# Patient Record
Sex: FEMALE | Race: WHITE | NOT HISPANIC OR LATINO | URBAN - METROPOLITAN AREA
[De-identification: names, ages, dates, MRNs, and addresses within clinical notes are randomized per-mention and may not be internally consistent; named-entity substitution may affect disease eponyms.]

---

## 2017-01-17 ENCOUNTER — OFFICE (AMBULATORY)
Dept: URBAN - METROPOLITAN AREA CLINIC 64 | Facility: CLINIC | Age: 72
End: 2017-01-17

## 2017-01-17 VITALS — HEIGHT: 67 IN

## 2017-01-17 DIAGNOSIS — K64.1 SECOND DEGREE HEMORRHOIDS: ICD-10-CM

## 2017-01-17 PROCEDURE — 46221 LIGATION OF HEMORRHOID(S): CPT | Performed by: INTERNAL MEDICINE

## 2017-02-03 ENCOUNTER — OFFICE (AMBULATORY)
Dept: URBAN - METROPOLITAN AREA CLINIC 64 | Facility: CLINIC | Age: 72
End: 2017-02-03

## 2017-02-03 VITALS — HEIGHT: 67 IN

## 2017-02-03 DIAGNOSIS — K64.1 SECOND DEGREE HEMORRHOIDS: ICD-10-CM

## 2017-02-03 PROCEDURE — 46221 LIGATION OF HEMORRHOID(S): CPT | Performed by: INTERNAL MEDICINE

## 2017-02-27 ENCOUNTER — OFFICE (AMBULATORY)
Dept: URBAN - METROPOLITAN AREA CLINIC 64 | Facility: CLINIC | Age: 72
End: 2017-02-27

## 2017-02-27 VITALS — HEIGHT: 67 IN

## 2017-02-27 DIAGNOSIS — K64.1 SECOND DEGREE HEMORRHOIDS: ICD-10-CM

## 2017-02-27 DIAGNOSIS — K62.5 HEMORRHAGE OF ANUS AND RECTUM: ICD-10-CM

## 2017-02-27 PROCEDURE — 46221 LIGATION OF HEMORRHOID(S): CPT | Performed by: INTERNAL MEDICINE

## 2020-04-13 ENCOUNTER — TRANSCRIBE ORDERS (OUTPATIENT)
Dept: LAB | Facility: HOSPITAL | Age: 75
End: 2020-04-13

## 2020-04-13 ENCOUNTER — LAB (OUTPATIENT)
Dept: LAB | Facility: HOSPITAL | Age: 75
End: 2020-04-13

## 2020-04-13 DIAGNOSIS — I10 HYPERTENSION, UNSPECIFIED TYPE: Primary | ICD-10-CM

## 2020-04-13 DIAGNOSIS — I10 HYPERTENSION, UNSPECIFIED TYPE: ICD-10-CM

## 2020-04-13 LAB
ANION GAP SERPL CALCULATED.3IONS-SCNC: 11.9 MMOL/L (ref 5–15)
BASOPHILS # BLD AUTO: 0.04 10*3/MM3 (ref 0–0.2)
BASOPHILS NFR BLD AUTO: 0.7 % (ref 0–1.5)
BUN BLD-MCNC: 15 MG/DL (ref 8–23)
BUN/CREAT SERPL: 19 (ref 7–25)
CALCIUM SPEC-SCNC: 9.3 MG/DL (ref 8.6–10.5)
CHLORIDE SERPL-SCNC: 100 MMOL/L (ref 98–107)
CO2 SERPL-SCNC: 26.1 MMOL/L (ref 22–29)
CREAT BLD-MCNC: 0.79 MG/DL (ref 0.57–1)
DEPRECATED RDW RBC AUTO: 40.2 FL (ref 37–54)
EOSINOPHIL # BLD AUTO: 0.21 10*3/MM3 (ref 0–0.4)
EOSINOPHIL NFR BLD AUTO: 3.5 % (ref 0.3–6.2)
ERYTHROCYTE [DISTWIDTH] IN BLOOD BY AUTOMATED COUNT: 12.6 % (ref 12.3–15.4)
GFR SERPL CREATININE-BSD FRML MDRD: 71 ML/MIN/1.73
GLUCOSE BLD-MCNC: 117 MG/DL (ref 65–99)
HCT VFR BLD AUTO: 36.4 % (ref 34–46.6)
HGB BLD-MCNC: 12.5 G/DL (ref 12–15.9)
IMM GRANULOCYTES # BLD AUTO: 0.01 10*3/MM3 (ref 0–0.05)
IMM GRANULOCYTES NFR BLD AUTO: 0.2 % (ref 0–0.5)
LYMPHOCYTES # BLD AUTO: 1.92 10*3/MM3 (ref 0.7–3.1)
LYMPHOCYTES NFR BLD AUTO: 32.3 % (ref 19.6–45.3)
MCH RBC QN AUTO: 30.3 PG (ref 26.6–33)
MCHC RBC AUTO-ENTMCNC: 34.3 G/DL (ref 31.5–35.7)
MCV RBC AUTO: 88.1 FL (ref 79–97)
MONOCYTES # BLD AUTO: 0.59 10*3/MM3 (ref 0.1–0.9)
MONOCYTES NFR BLD AUTO: 9.9 % (ref 5–12)
NEUTROPHILS # BLD AUTO: 3.18 10*3/MM3 (ref 1.7–7)
NEUTROPHILS NFR BLD AUTO: 53.4 % (ref 42.7–76)
NRBC BLD AUTO-RTO: 0 /100 WBC (ref 0–0.2)
PLATELET # BLD AUTO: 186 10*3/MM3 (ref 140–450)
PMV BLD AUTO: 10.4 FL (ref 6–12)
POTASSIUM BLD-SCNC: 4.2 MMOL/L (ref 3.5–5.2)
RBC # BLD AUTO: 4.13 10*6/MM3 (ref 3.77–5.28)
SODIUM BLD-SCNC: 138 MMOL/L (ref 136–145)
WBC NRBC COR # BLD: 5.95 10*3/MM3 (ref 3.4–10.8)

## 2020-04-13 PROCEDURE — 85025 COMPLETE CBC W/AUTO DIFF WBC: CPT

## 2020-04-13 PROCEDURE — 80048 BASIC METABOLIC PNL TOTAL CA: CPT

## 2020-04-13 PROCEDURE — 36415 COLL VENOUS BLD VENIPUNCTURE: CPT

## 2023-03-01 ENCOUNTER — APPOINTMENT (OUTPATIENT)
Dept: GENERAL RADIOLOGY | Facility: HOSPITAL | Age: 78
End: 2023-03-01
Payer: MEDICARE

## 2023-03-01 ENCOUNTER — HOSPITAL ENCOUNTER (EMERGENCY)
Facility: HOSPITAL | Age: 78
Discharge: HOME | End: 2023-03-02
Attending: EMERGENCY MEDICINE | Admitting: EMERGENCY MEDICINE
Payer: MEDICARE

## 2023-03-01 DIAGNOSIS — R55 NEAR SYNCOPE: Primary | ICD-10-CM

## 2023-03-01 PROCEDURE — 71045 X-RAY EXAM CHEST 1 VIEW: CPT

## 2023-03-01 PROCEDURE — 99283 EMERGENCY DEPT VISIT LOW MDM: CPT

## 2023-03-01 PROCEDURE — 83880 ASSAY OF NATRIURETIC PEPTIDE: CPT | Performed by: EMERGENCY MEDICINE

## 2023-03-01 PROCEDURE — 83735 ASSAY OF MAGNESIUM: CPT | Performed by: EMERGENCY MEDICINE

## 2023-03-01 PROCEDURE — 93005 ELECTROCARDIOGRAM TRACING: CPT

## 2023-03-01 PROCEDURE — 36415 COLL VENOUS BLD VENIPUNCTURE: CPT

## 2023-03-01 PROCEDURE — 80053 COMPREHEN METABOLIC PANEL: CPT | Performed by: EMERGENCY MEDICINE

## 2023-03-01 PROCEDURE — 84484 ASSAY OF TROPONIN QUANT: CPT | Performed by: EMERGENCY MEDICINE

## 2023-03-01 PROCEDURE — 85025 COMPLETE CBC W/AUTO DIFF WBC: CPT | Performed by: EMERGENCY MEDICINE

## 2023-03-02 VITALS
HEIGHT: 66 IN | RESPIRATION RATE: 16 BRPM | HEART RATE: 60 BPM | OXYGEN SATURATION: 96 % | BODY MASS INDEX: 38.57 KG/M2 | DIASTOLIC BLOOD PRESSURE: 69 MMHG | SYSTOLIC BLOOD PRESSURE: 162 MMHG | TEMPERATURE: 98.4 F | WEIGHT: 240 LBS

## 2023-03-02 LAB
ALBUMIN SERPL-MCNC: 3.5 G/DL (ref 3.5–5.2)
ALBUMIN/GLOB SERPL: 1.1 G/DL
ALP SERPL-CCNC: 90 U/L (ref 39–117)
ALT SERPL W P-5'-P-CCNC: 22 U/L (ref 1–33)
ANION GAP SERPL CALCULATED.3IONS-SCNC: 11 MMOL/L (ref 5–15)
AST SERPL-CCNC: 24 U/L (ref 1–32)
BASOPHILS # BLD AUTO: 0 10*3/MM3 (ref 0–0.2)
BASOPHILS NFR BLD AUTO: 0.6 % (ref 0–1.5)
BILIRUB SERPL-MCNC: 0.4 MG/DL (ref 0–1.2)
BUN SERPL-MCNC: 15 MG/DL (ref 8–23)
BUN/CREAT SERPL: 17.2 (ref 7–25)
CALCIUM SPEC-SCNC: 8.9 MG/DL (ref 8.6–10.5)
CHLORIDE SERPL-SCNC: 101 MMOL/L (ref 98–107)
CO2 SERPL-SCNC: 24 MMOL/L (ref 22–29)
CREAT SERPL-MCNC: 0.87 MG/DL (ref 0.57–1)
DEPRECATED RDW RBC AUTO: 44.6 FL (ref 37–54)
EGFRCR SERPLBLD CKD-EPI 2021: 68.7 ML/MIN/1.73
EOSINOPHIL # BLD AUTO: 0.3 10*3/MM3 (ref 0–0.4)
EOSINOPHIL NFR BLD AUTO: 4.1 % (ref 0.3–6.2)
ERYTHROCYTE [DISTWIDTH] IN BLOOD BY AUTOMATED COUNT: 13.7 % (ref 12.3–15.4)
GLOBULIN UR ELPH-MCNC: 3.2 GM/DL
GLUCOSE SERPL-MCNC: 160 MG/DL (ref 65–99)
HCT VFR BLD AUTO: 34.8 % (ref 34–46.6)
HGB BLD-MCNC: 11.7 G/DL (ref 12–15.9)
HOLD SPECIMEN: NORMAL
HOLD SPECIMEN: NORMAL
LYMPHOCYTES # BLD AUTO: 1.6 10*3/MM3 (ref 0.7–3.1)
LYMPHOCYTES NFR BLD AUTO: 21.6 % (ref 19.6–45.3)
MAGNESIUM SERPL-MCNC: 1.8 MG/DL (ref 1.6–2.4)
MCH RBC QN AUTO: 29.5 PG (ref 26.6–33)
MCHC RBC AUTO-ENTMCNC: 33.6 G/DL (ref 31.5–35.7)
MCV RBC AUTO: 87.8 FL (ref 79–97)
MONOCYTES # BLD AUTO: 0.7 10*3/MM3 (ref 0.1–0.9)
MONOCYTES NFR BLD AUTO: 8.9 % (ref 5–12)
NEUTROPHILS NFR BLD AUTO: 4.9 10*3/MM3 (ref 1.7–7)
NEUTROPHILS NFR BLD AUTO: 64.8 % (ref 42.7–76)
NRBC BLD AUTO-RTO: 0.2 /100 WBC (ref 0–0.2)
NT-PROBNP SERPL-MCNC: 145.1 PG/ML (ref 0–1800)
PLATELET # BLD AUTO: 161 10*3/MM3 (ref 140–450)
PMV BLD AUTO: 8.4 FL (ref 6–12)
POTASSIUM SERPL-SCNC: 3.8 MMOL/L (ref 3.5–5.2)
PROT SERPL-MCNC: 6.7 G/DL (ref 6–8.5)
RBC # BLD AUTO: 3.96 10*6/MM3 (ref 3.77–5.28)
SODIUM SERPL-SCNC: 136 MMOL/L (ref 136–145)
TROPONIN T SERPL HS-MCNC: 21 NG/L
WBC NRBC COR # BLD: 7.5 10*3/MM3 (ref 3.4–10.8)
WHOLE BLOOD HOLD COAG: NORMAL
WHOLE BLOOD HOLD SPECIMEN: NORMAL

## 2023-03-02 NOTE — ED PROVIDER NOTES
"Subjective   History of Present Illness  77-year-old female prior medical history of hypertension, hyperlipidemia, presents status post syncopal episode.  They were standing in line for elevator after event started to feel hot walked to a place where she felt a cool breeze and sat down because she felt like she was going to pass out.  Never lost consciousness.  Denies any chest pain or shortness of breath.  No vomiting.  Has had a cough for the last several days and felt somewhat weak.  No fevers.      Review of Systems   Neurological:        Positive for near syncope.       No past medical history on file.    No Known Allergies    No past surgical history on file.    No family history on file.    Social History     Socioeconomic History   • Marital status:            Objective   Physical Exam  Constitutional:  No acute distress.  Head:  Atraumatic.  Normocephalic.   Eyes:  No scleral icterus. Normal conjunctivae  ENT:  Moist mucosa.  No nasal discharge present.  Cardiovascular:  Well perfused.  Equal pulses.  Regular rhythm and normal rate.  Normal capillary refill.    Pulmonary/Chest:  No respiratory distress.  Airway patent.  No tachypnea.  No accessory muscle usage.  Clear to auscultation bilaterally.  Abdominal:  Nondistended. Nontender.   Extremities: Trace bilateral lower extremity peripheral edema.  No Deformity  Skin:  Warm, dry  Neurological:  Alert, awake, and appropriate.  Normal speech.      Procedures           ED Course      /60   Pulse 61   Temp 98.4 °F (36.9 °C) (Oral)   Resp 14   Ht 167.6 cm (66\")   Wt 109 kg (240 lb)   SpO2 98%   BMI 38.74 kg/m²   Labs Reviewed   COMPREHENSIVE METABOLIC PANEL - Abnormal; Notable for the following components:       Result Value    Glucose 160 (*)     All other components within normal limits    Narrative:     GFR Normal >60  Chronic Kidney Disease <60  Kidney Failure <15    The GFR formula is only valid for adults with stable renal function " between ages 18 and 70.   SINGLE HSTROPONIN T - Abnormal; Notable for the following components:    HS Troponin T 21 (*)     All other components within normal limits    Narrative:     High Sensitive Troponin T Reference Range:  <10.0 ng/L- Negative Female for AMI  <15.0 ng/L- Negative Male for AMI  >=10 - Abnormal Female indicating possible myocardial injury.  >=15 - Abnormal Male indicating possible myocardial injury.   Clinicians would have to utilize clinical acumen, EKG, Troponin, and serial changes to determine if it is an Acute Myocardial Infarction or myocardial injury due to an underlying chronic condition.        CBC WITH AUTO DIFFERENTIAL - Abnormal; Notable for the following components:    Hemoglobin 11.7 (*)     All other components within normal limits   BNP (IN-HOUSE) - Normal    Narrative:     Among patients with dyspnea, NT-proBNP is highly sensitive for the detection of acute congestive heart failure. In addition NT-proBNP of <300 pg/ml effectively rules out acute congestive heart failure with 99% negative predictive value.    Results may be falsely decreased if patient taking Biotin.     MAGNESIUM - Normal   RAINBOW DRAW    Narrative:     The following orders were created for panel order Delta Draw.  Procedure                               Abnormality         Status                     ---------                               -----------         ------                     Green Top (Gel)[[id number]]                                  Final result               Lavender Top[[id number]]                                     Final result               Gold Top - SST[[id number]]                                   Final result               Light Blue Top[[id number]]                                   Final result                 Please view results for these tests on the individual orders.   GREEN TOP   LAVENDER TOP   GOLD TOP - SST   LIGHT BLUE TOP   CBC AND DIFFERENTIAL    Narrative:     The following orders  were created for panel order CBC & Differential.  Procedure                               Abnormality         Status                     ---------                               -----------         ------                     CBC Auto Differential[[id number]]        Abnormal            Final result                 Please view results for these tests on the individual orders.     Medications - No data to display  No radiology results for the last day                                       [facility]   EKG # 1  Signed and interpreted by the EP.  Time Interpreted: 10:56 PM  Rate: 55  Rhythm: Sinus bradycardia  Axis: Normal axis  Intervals: First-degree AV block  ST Segments: No acute ischemic changes     Comparison: No significant change from 8/27/2016 EKG.      My interpretation of chest x-ray with no pneumothorax, no focal infiltrate.    Reviewed June 29, 2016 note from Dr. Brewer or patient was seen for follow-up of chest pain after having cath that showed normal coronaries and normal LV function.    Discussed patient indeterminate troponin level.  States she does not have any chest pain or shortness of breath and does not want to stay.  Recommended repeat to help ensure that this was not secondary to a cardiac event.  Patient states she does not want to be here and wants to leave and will follow up with Dr. Brewer.  Final diagnoses:   Near syncope       ED Disposition  ED Disposition     ED Disposition   Discharge    Condition   Stable    Comment   --             Mina Mckeon MD  [address] [facility] [place] [address]  [phone]    In 3 days           Medication List      No changes were made to your prescriptions during this visit.          Maldonado Leija MD  03/02/23 0035       Maldonado Leija MD  03/02/23 0036

## 2023-03-05 LAB — QT INTERVAL: 476 MS

## 2023-03-15 ENCOUNTER — PATIENT ROUNDING (BHMG ONLY) (OUTPATIENT)
Dept: CARDIOLOGY | Facility: CLINIC | Age: 78
End: 2023-03-15

## 2023-03-15 ENCOUNTER — OFFICE VISIT (OUTPATIENT)
Dept: CARDIOLOGY | Facility: CLINIC | Age: 78
End: 2023-03-15
Payer: MEDICARE

## 2023-03-15 ENCOUNTER — TELEPHONE (OUTPATIENT)
Dept: CARDIOLOGY | Facility: CLINIC | Age: 78
End: 2023-03-15

## 2023-03-15 VITALS
HEART RATE: 66 BPM | BODY MASS INDEX: 33.91 KG/M2 | SYSTOLIC BLOOD PRESSURE: 136 MMHG | WEIGHT: 211 LBS | DIASTOLIC BLOOD PRESSURE: 46 MMHG | HEIGHT: 66 IN | OXYGEN SATURATION: 98 %

## 2023-03-15 DIAGNOSIS — R06.02 SHORTNESS OF BREATH: ICD-10-CM

## 2023-03-15 DIAGNOSIS — I20.9 ANGINA PECTORIS: Primary | ICD-10-CM

## 2023-03-15 DIAGNOSIS — I10 PRIMARY HYPERTENSION: ICD-10-CM

## 2023-03-15 DIAGNOSIS — E11.9 TYPE 2 DIABETES MELLITUS WITHOUT COMPLICATION, WITHOUT LONG-TERM CURRENT USE OF INSULIN: ICD-10-CM

## 2023-03-15 PROCEDURE — 1159F MED LIST DOCD IN RCRD: CPT | Performed by: INTERNAL MEDICINE

## 2023-03-15 PROCEDURE — 1160F RVW MEDS BY RX/DR IN RCRD: CPT | Performed by: INTERNAL MEDICINE

## 2023-03-15 PROCEDURE — 99204 OFFICE O/P NEW MOD 45 MIN: CPT | Performed by: INTERNAL MEDICINE

## 2023-03-15 RX ORDER — AMITRIPTYLINE HYDROCHLORIDE 25 MG/1
25 TABLET, FILM COATED ORAL AS NEEDED
COMMUNITY

## 2023-03-15 RX ORDER — CEFDINIR 300 MG/1
300 CAPSULE ORAL 2 TIMES DAILY
COMMUNITY

## 2023-03-15 RX ORDER — PANTOPRAZOLE SODIUM 40 MG/1
40 TABLET, DELAYED RELEASE ORAL DAILY
COMMUNITY

## 2023-03-15 RX ORDER — GABAPENTIN 100 MG/1
100 CAPSULE ORAL 3 TIMES DAILY
COMMUNITY

## 2023-03-15 RX ORDER — METOPROLOL TARTRATE 50 MG/1
50 TABLET, FILM COATED ORAL 2 TIMES DAILY
COMMUNITY

## 2023-03-15 NOTE — PROGRESS NOTES
"    Subjective:     Encounter Date:03/15/2023      Patient ID: Rain Aguilera is a 77 y.o. female.    Chief Complaint:  History of Present Illness 77-year-old white female with history of diabetes hypertension family history of coronary disease presents to my office for a new consultation.  Patient has been having symptoms of chest pain which she describes as substernal discomfort radiating to the back and also with shortness of breath.  Shortness of breath and chest pain occurring with exertion and relieved with rest.  No complains any PND orthopnea.  No palpitation but has been having some dizziness.  Recently she was outside when she had an episode of dizziness and near syncope and had to go to the ER.  She had bradycardia and was on beta-blockers at that time.  She does not smoke.  /46   Pulse 66   Ht 167.6 cm (66\")   Wt 95.7 kg (211 lb)   SpO2 98%   BMI 34.06 kg/m²     The following portions of the patient's history were reviewed and updated as appropriate: allergies, current medications, past family history, past medical history, past social history, past surgical history and problem list.  Past Medical History:   Diagnosis Date   • Arrhythmia Forever   • Cancer (HCC) 10/1983    Colon   • Diabetes mellitus (HCC) 10/2022   • Hyperlipidemia 2022   • Hypertension 2020     Past Surgical History:   Procedure Laterality Date   • APPENDECTOMY     • BILATERAL BREAST REDUCTION     • CARDIAC CATHETERIZATION  5 years ago   • CARPAL TUNNEL RELEASE Bilateral    • COLON BIOPSY     • COLONOSCOPY     • TENNIS ELBOW RELEASE     • TUBAL ABDOMINAL LIGATION       Social History     Socioeconomic History   • Marital status:    Tobacco Use   • Smoking status: Former     Packs/day: 1.00     Years: 20.00     Pack years: 20.00     Types: Cigarettes     Start date: 1958     Quit date: 10/1/1983     Years since quittin.4     Passive exposure: Never   • Smokeless tobacco: Never   Vaping Use   • Vaping " Use: Never used   Substance and Sexual Activity   • Alcohol use: Not Currently     Comment: special occasions 1 drink   • Drug use: Never   • Sexual activity: Not Currently     Partners: Male     Birth control/protection: Pill, Tubal ligation     Comment: pill and in 1983 tubes tied     Family History   Problem Relation Age of Onset   • Asthma Mother    • Fainting Mother    • Heart disease Mother        Current Outpatient Medications:   •  amitriptyline (ELAVIL) 25 MG tablet, Take 1 tablet by mouth As Needed for Sleep., Disp: , Rfl:   •  cefdinir (OMNICEF) 300 MG capsule, Take 1 capsule by mouth 2 (Two) Times a Day., Disp: , Rfl:   •  gabapentin (NEURONTIN) 100 MG capsule, Take 1 capsule by mouth 3 (Three) Times a Day., Disp: , Rfl:   •  METFORMIN HCL ER, MOD, PO, Take 50 mg by mouth., Disp: , Rfl:   •  metoprolol tartrate (LOPRESSOR) 25 MG tablet, Take 1 tablet by mouth 2 (Two) Times a Day., Disp: , Rfl:   •  metoprolol tartrate (LOPRESSOR) 50 MG tablet, Take 1 tablet by mouth 2 (Two) Times a Day., Disp: , Rfl:   •  pantoprazole (PROTONIX) 40 MG EC tablet, Take 1 tablet by mouth Daily., Disp: , Rfl:   No Known Allergies    Review of Systems   Constitutional: Positive for malaise/fatigue.   Cardiovascular: Positive for chest pain. Negative for dyspnea on exertion, leg swelling and palpitations.   Respiratory: Positive for shortness of breath. Negative for cough.    Gastrointestinal: Negative for abdominal pain, nausea and vomiting.   Neurological: Positive for dizziness, light-headedness and numbness. Negative for focal weakness and headaches.   All other systems reviewed and are negative.             Objective:     Constitutional:       Appearance: Well-developed.   Eyes:      General: No scleral icterus.     Conjunctiva/sclera: Conjunctivae normal.      Pupils: Pupils are equal, round, and reactive to light.   HENT:      Head: Normocephalic and atraumatic.   Neck:      Vascular: No carotid bruit or JVD.    Pulmonary:      Effort: Pulmonary effort is normal.      Breath sounds: Normal breath sounds. No wheezing. No rales.   Cardiovascular:      Normal rate. Regular rhythm.   Pulses:     Intact distal pulses.   Abdominal:      General: Bowel sounds are normal.      Palpations: Abdomen is soft.   Musculoskeletal: Normal range of motion.      Cervical back: Normal range of motion and neck supple. Skin:     General: Skin is warm and dry.      Findings: No rash.   Neurological:      Mental Status: Alert.      Comments: No focal deficits         Procedures    Lab Review:       Assessment:          Diagnosis Plan   1. Angina pectoris ([facility])        2. Shortness of breath        3. Type 2 diabetes mellitus without complication, without long-term current use of insulin ([facility])        4. Primary hypertension               Plan:       Patient currently with chest pain and shortness of breath and has risk factors for coronary disease  Patient will have an echocardiogram for LV function valvular abnormalities  Patient will also have a stress Myoview to rule out ischemia  Patient's heart rate is low and hence I will decrease the dose of beta-blockers to 50 mg twice daily instead of 75 mg  Patient also has diabetes and is followed by the primary care doctor  Further treatment based on test results  If patient has more symptoms of dizziness then she will have a Holter monitor also.

## 2023-03-15 NOTE — TELEPHONE ENCOUNTER
Pt said she is having surgery on her tongue with Dr Paul in [place] on 3/24/23. She said she wanted to make sure Dr Mckeon was aware of this and to make sure he clears her for the surgery. Pt also said that she went to [facility] on 3/1/23 and was concerned that Dr Mckeon said that he could not see the results in our system. Please call pt back at [phone].

## 2023-03-17 ENCOUNTER — TELEPHONE (OUTPATIENT)
Dept: CARDIOLOGY | Facility: CLINIC | Age: 78
End: 2023-03-17
Payer: MEDICARE

## 2023-03-17 NOTE — TELEPHONE ENCOUNTER
Called Dr. Paul office patient has testing scheduled 4/12/2023 we cannot clear her until after testing is done.

## 2023-04-12 ENCOUNTER — HOSPITAL ENCOUNTER (OUTPATIENT)
Dept: CARDIOLOGY | Facility: HOSPITAL | Age: 78
Discharge: HOME | End: 2023-04-12
Payer: MEDICARE

## 2023-04-12 DIAGNOSIS — R06.02 SHORTNESS OF BREATH: ICD-10-CM

## 2023-04-12 DIAGNOSIS — I20.9 ANGINA PECTORIS: ICD-10-CM

## 2023-04-12 DIAGNOSIS — E11.9 TYPE 2 DIABETES MELLITUS WITHOUT COMPLICATION, WITHOUT LONG-TERM CURRENT USE OF INSULIN: ICD-10-CM

## 2023-04-12 DIAGNOSIS — I10 PRIMARY HYPERTENSION: ICD-10-CM

## 2023-04-12 LAB
BH CV ECHO MEAS - ACS: 1.72 CM
BH CV ECHO MEAS - AO MAX PG: 4.5 MMHG
BH CV ECHO MEAS - AO MEAN PG: 2.44 MMHG
BH CV ECHO MEAS - AO ROOT DIAM: 3.1 CM
BH CV ECHO MEAS - AO V2 MAX: 106 CM/SEC
BH CV ECHO MEAS - AO V2 VTI: 24.6 CM
BH CV ECHO MEAS - AVA(I,D): 3.3 CM2
BH CV ECHO MEAS - EDV(CUBED): 57.3 ML
BH CV ECHO MEAS - EDV(MOD-SP4): 72.7 ML
BH CV ECHO MEAS - EF(MOD-BP): 57 %
BH CV ECHO MEAS - EF(MOD-SP4): 57.1 %
BH CV ECHO MEAS - ESV(CUBED): 18.8 ML
BH CV ECHO MEAS - ESV(MOD-SP4): 31.2 ML
BH CV ECHO MEAS - FS: 31.1 %
BH CV ECHO MEAS - IVS/LVPW: 0.84 CM
BH CV ECHO MEAS - IVSD: 0.87 CM
BH CV ECHO MEAS - LA DIMENSION: 4 CM
BH CV ECHO MEAS - LV DIASTOLIC VOL/BSA (35-75): 35.5 CM2
BH CV ECHO MEAS - LV MASS(C)D: 112.5 GRAMS
BH CV ECHO MEAS - LV MAX PG: 4 MMHG
BH CV ECHO MEAS - LV MEAN PG: 2.5 MMHG
BH CV ECHO MEAS - LV SYSTOLIC VOL/BSA (12-30): 15.2 CM2
BH CV ECHO MEAS - LV V1 MAX: 100.6 CM/SEC
BH CV ECHO MEAS - LV V1 VTI: 25.8 CM
BH CV ECHO MEAS - LVIDD: 3.9 CM
BH CV ECHO MEAS - LVIDS: 2.7 CM
BH CV ECHO MEAS - LVOT AREA: 3.1 CM2
BH CV ECHO MEAS - LVOT DIAM: 2 CM
BH CV ECHO MEAS - LVPWD: 1.04 CM
BH CV ECHO MEAS - MV A MAX VEL: 105 CM/SEC
BH CV ECHO MEAS - MV DEC SLOPE: 318.7 CM/SEC2
BH CV ECHO MEAS - MV DEC TIME: 0.28 MSEC
BH CV ECHO MEAS - MV E MAX VEL: 90 CM/SEC
BH CV ECHO MEAS - MV E/A: 0.86
BH CV ECHO MEAS - MV MAX PG: 4.5 MMHG
BH CV ECHO MEAS - MV MEAN PG: 1.94 MMHG
BH CV ECHO MEAS - MV V2 VTI: 29.4 CM
BH CV ECHO MEAS - MVA(VTI): 2.7 CM2
BH CV ECHO MEAS - PA ACC TIME: 0.1 SEC
BH CV ECHO MEAS - PA PR(ACCEL): 33.8 MMHG
BH CV ECHO MEAS - PULM A REVS DUR: 0.09 SEC
BH CV ECHO MEAS - PULM A REVS VEL: 26.5 CM/SEC
BH CV ECHO MEAS - PULM DIAS VEL: 36.1 CM/SEC
BH CV ECHO MEAS - PULM S/D: 1.48
BH CV ECHO MEAS - PULM SYS VEL: 53.4 CM/SEC
BH CV ECHO MEAS - RAP SYSTOLE: 8 MMHG
BH CV ECHO MEAS - RV MAX PG: 1.16 MMHG
BH CV ECHO MEAS - RV V1 MAX: 53.8 CM/SEC
BH CV ECHO MEAS - RV V1 VTI: 13.4 CM
BH CV ECHO MEAS - RVDD: 3.6 CM
BH CV ECHO MEAS - SI(MOD-SP4): 20.3 ML/M2
BH CV ECHO MEAS - SV(LVOT): 80.8 ML
BH CV ECHO MEAS - SV(MOD-SP4): 41.6 ML
BH CV REST NUCLEAR ISOTOPE DOSE: 10.5 MCI
BH CV STRESS BP STAGE 1: NORMAL
BH CV STRESS COMMENTS STAGE 1: NORMAL
BH CV STRESS DOSE REGADENOSON STAGE 1: 0.4
BH CV STRESS DURATION MIN STAGE 1: 0
BH CV STRESS DURATION SEC STAGE 1: 10
BH CV STRESS HR STAGE 1: 67
BH CV STRESS NUCLEAR ISOTOPE DOSE: 32.2 MCI
BH CV STRESS PROTOCOL 1: NORMAL
BH CV STRESS RECOVERY BP: NORMAL MMHG
BH CV STRESS RECOVERY HR: 75 BPM
BH CV STRESS STAGE 1: 1
LV EF NUC BP: 73 %
MAXIMAL PREDICTED HEART RATE: 143 BPM
MAXIMAL PREDICTED HEART RATE: 143 BPM
STRESS BASELINE BP: NORMAL MMHG
STRESS BASELINE HR: 67 BPM
STRESS TARGET HR: 122 BPM
STRESS TARGET HR: 122 BPM

## 2023-04-12 PROCEDURE — A9500 TC99M SESTAMIBI: HCPCS | Performed by: INTERNAL MEDICINE

## 2023-04-12 PROCEDURE — 0 TECHNETIUM SESTAMIBI: Performed by: INTERNAL MEDICINE

## 2023-04-12 PROCEDURE — 93306 TTE W/DOPPLER COMPLETE: CPT

## 2023-04-12 PROCEDURE — 25010000002 REGADENOSON 0.4 MG/5ML SOLUTION: Performed by: INTERNAL MEDICINE

## 2023-04-12 PROCEDURE — 78452 HT MUSCLE IMAGE SPECT MULT: CPT

## 2023-04-12 PROCEDURE — 93017 CV STRESS TEST TRACING ONLY: CPT

## 2023-04-12 RX ADMIN — TECHNETIUM TC 99M SESTAMIBI 1 DOSE: 1 INJECTION INTRAVENOUS at 14:17

## 2023-04-12 RX ADMIN — TECHNETIUM TC 99M SESTAMIBI 1 DOSE: 1 INJECTION INTRAVENOUS at 14:16

## 2023-04-12 RX ADMIN — REGADENOSON 0.4 MG: 0.08 INJECTION, SOLUTION INTRAVENOUS at 14:16
